# Patient Record
Sex: FEMALE | URBAN - METROPOLITAN AREA
[De-identification: names, ages, dates, MRNs, and addresses within clinical notes are randomized per-mention and may not be internally consistent; named-entity substitution may affect disease eponyms.]

---

## 2020-09-17 ENCOUNTER — APPOINTMENT (RX ONLY)
Dept: URBAN - METROPOLITAN AREA CLINIC 43 | Facility: CLINIC | Age: 55
Setting detail: DERMATOLOGY
End: 2020-09-17

## 2020-09-17 VITALS — TEMPERATURE: 98.2 F

## 2020-09-17 DIAGNOSIS — L20.89 OTHER ATOPIC DERMATITIS: ICD-10-CM

## 2020-09-17 DIAGNOSIS — L81.1 CHLOASMA: ICD-10-CM

## 2020-09-17 PROBLEM — L20.84 INTRINSIC (ALLERGIC) ECZEMA: Status: ACTIVE | Noted: 2020-09-17

## 2020-09-17 PROCEDURE — ? PRESCRIPTION

## 2020-09-17 PROCEDURE — 99202 OFFICE O/P NEW SF 15 MIN: CPT

## 2020-09-17 PROCEDURE — ? ADDITIONAL NOTES

## 2020-09-17 PROCEDURE — ? COUNSELING

## 2020-09-17 PROCEDURE — ? TREATMENT REGIMEN

## 2020-09-17 RX ORDER — HYDROCORTISONE 25 MG/G
1 OINTMENT TOPICAL BID
Qty: 1 | Refills: 1 | Status: ERX | COMMUNITY
Start: 2020-09-17

## 2020-09-17 RX ADMIN — HYDROCORTISONE 1: 25 OINTMENT TOPICAL at 00:00

## 2020-09-17 ASSESSMENT — LOCATION DETAILED DESCRIPTION DERM
LOCATION DETAILED: RIGHT INFERIOR TEMPLE
LOCATION DETAILED: RIGHT CENTRAL MALAR CHEEK
LOCATION DETAILED: LEFT INFERIOR TEMPLE
LOCATION DETAILED: RIGHT MEDIAL SUPERIOR EYELID
LOCATION DETAILED: LEFT CENTRAL MALAR CHEEK
LOCATION DETAILED: LEFT MEDIAL SUPERIOR EYELID

## 2020-09-17 ASSESSMENT — LOCATION ZONE DERM
LOCATION ZONE: EYELID
LOCATION ZONE: FACE

## 2020-09-17 ASSESSMENT — LOCATION SIMPLE DESCRIPTION DERM
LOCATION SIMPLE: RIGHT CHEEK
LOCATION SIMPLE: LEFT TEMPLE
LOCATION SIMPLE: LEFT CHEEK
LOCATION SIMPLE: RIGHT TEMPLE
LOCATION SIMPLE: LEFT SUPERIOR EYELID
LOCATION SIMPLE: RIGHT SUPERIOR EYELID

## 2020-09-17 NOTE — PROCEDURE: ADDITIONAL NOTES
Additional Notes: I discussed with pt we can consider evaluation after eczema clears on face because treatment could be also irritating. Pt has follow up set up.
Detail Level: Detailed
Additional Notes: B/l eyelids and temples\\nGentle skin care discussed\\nAvoid face masks and other irritants\\nCleans houses, has noticed chemicals irritating the rash\\n\\nStart HCT 2.5 ointment BID x 2 weeks, cetaphil cream on top\\nGentle cetaphil cleanser\\nF/u in 2-3 weeks \\nPam in room to translate

## 2020-09-17 NOTE — PROCEDURE: TREATMENT REGIMEN
Detail Level: Zone
Samples Given: Cetaphil cleanser twice daily to wash face.
Initiate Treatment: Hydrocortisone ointment 2.5% aaa face twice daily x 2 weeks, then on weekends as needed for flares. \\n\\nCetaphil cream on top.

## 2020-09-17 NOTE — HPI: RASH
How Severe Is Your Rash?: mild
Is This A New Presentation, Or A Follow-Up?: Rash
Additional History: Pt use baby oil once and irritated it more.
